# Patient Record
Sex: FEMALE
[De-identification: names, ages, dates, MRNs, and addresses within clinical notes are randomized per-mention and may not be internally consistent; named-entity substitution may affect disease eponyms.]

---

## 2017-01-10 ENCOUNTER — APPOINTMENT (OUTPATIENT)
Dept: ORTHOPEDIC SURGERY | Facility: CLINIC | Age: 72
End: 2017-01-10

## 2017-01-10 VITALS — SYSTOLIC BLOOD PRESSURE: 134 MMHG | DIASTOLIC BLOOD PRESSURE: 72 MMHG | HEART RATE: 57 BPM

## 2017-01-10 DIAGNOSIS — M79.9 SOFT TISSUE DISORDER, UNSPECIFIED: ICD-10-CM

## 2022-07-06 ENCOUNTER — APPOINTMENT (OUTPATIENT)
Dept: RHEUMATOLOGY | Facility: CLINIC | Age: 77
End: 2022-07-06

## 2022-07-06 VITALS
BODY MASS INDEX: 25.87 KG/M2 | DIASTOLIC BLOOD PRESSURE: 69 MMHG | WEIGHT: 146 LBS | HEART RATE: 68 BPM | SYSTOLIC BLOOD PRESSURE: 137 MMHG | HEIGHT: 63 IN | TEMPERATURE: 97.9 F | OXYGEN SATURATION: 98 %

## 2022-07-06 DIAGNOSIS — I25.10 ATHEROSCLEROTIC HEART DISEASE OF NATIVE CORONARY ARTERY W/OUT ANGINA PECTORIS: ICD-10-CM

## 2022-07-06 DIAGNOSIS — I26.99 OTHER PULMONARY EMBOLISM W/OUT ACUTE COR PULMONALE: ICD-10-CM

## 2022-07-06 DIAGNOSIS — M17.10 UNILATERAL PRIMARY OSTEOARTHRITIS, UNSPECIFIED KNEE: ICD-10-CM

## 2022-07-06 DIAGNOSIS — I49.9 CARDIAC ARRHYTHMIA, UNSPECIFIED: ICD-10-CM

## 2022-07-06 DIAGNOSIS — Z82.49 FAMILY HISTORY OF ISCHEMIC HEART DISEASE AND OTHER DISEASES OF THE CIRCULATORY SYSTEM: ICD-10-CM

## 2022-07-06 PROCEDURE — 99204 OFFICE O/P NEW MOD 45 MIN: CPT

## 2022-07-06 RX ORDER — DICLOFENAC SODIUM 1% 10 MG/G
1 GEL TOPICAL
Qty: 3 | Refills: 0 | Status: ACTIVE | COMMUNITY
Start: 2022-07-06 | End: 1900-01-01

## 2022-07-06 RX ORDER — PRAVASTATIN SODIUM 10 MG/1
10 TABLET ORAL
Refills: 0 | Status: ACTIVE | COMMUNITY
Start: 2022-07-06

## 2022-07-06 NOTE — HISTORY OF PRESENT ILLNESS
[FreeTextEntry1] : 76 y/o F here initial visit. \par \par Pt reports since 2016, pt had R knee pain. Pt used to work in nursing home. Pt said she had fall there once. 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 Pt was given medication, pt pain went away. Pt says however, lately, when it is about to rain, pt has knee pain. Pt also hears significant crepitus in R knee. \par Pt was told she had a tumor, and went to a [facility], and was told mass is benign. \par Pt was told that she should have conservative therapy, and not have surgery. \par Pt says tylenol, and rest helps knee pain. However, when pt carries heavy objects and walks at certain distance, pain is worse. Using stairs makes pain worse. \par \par No fevers, h/a, rashes, hair loss, oral ulcers, epistaxis, sinusitis,  swollen glands, dry mouth, dry eyes, CP, SOB, cough, vision changes, abdominal pain, GERD, n/v/d, blood in stool or urine, focal weakness, sensory loss,  Raynaud's, weight loss. \par \par PE in 2007, not sure if provoked. Pt was 5 years on coumadin, and then pt was changed to eliquis

## 2022-07-06 NOTE — ASSESSMENT
[FreeTextEntry1] : 78 y/o F  on eliquis, with severe R knee OA, lateral/medial meniscal tear\par =pain of R knee w activities\par =no swelling, stiffness\par =MRI of R knee 2016 w severe OA, meniscal tear\par \par Explained OA as condition, including treatment options. Explained there are various treatment to treat pain, but no medicine to stop progression. Recommended weight loss and muscle strengthening around joints in order to help stop or slow progression of OA. \par \par Discussed options for OA, and given pain not persistent, pt will use diclofenac gel. Counseled to use thin layer to minimize systemic absoprtion given pt on eliquis and w CAD. Advised against NSAIDs like meloxicam, given pt on eliquis and w CAD. \par \par Plan\par -rx diclofenac gel \par RTO PRN

## 2022-07-06 NOTE — DATA REVIEWED
[FreeTextEntry1] : MRI of R knee reviewed from 2016\par medial/lateral meniscus tear\par severe OA\par lobulated mass (chondrocalcinosis vs PVNS) \par

## 2022-07-06 NOTE — PHYSICAL EXAM
[General Appearance - Well Nourished] : well nourished [General Appearance - Well Developed] : well developed [Sclera] : the sclera and conjunctiva were normal [Auscultation Breath Sounds / Voice Sounds] : lungs were clear to auscultation bilaterally [Heart Sounds] : normal S1 and S2 [Heart Sounds Gallop] : no gallops [Murmurs] : no murmurs [Heart Sounds Pericardial Friction Rub] : no pericardial rub [Abdomen Soft] : soft [Abdomen Tenderness] : non-tender [] : no rash [Oriented To Time, Place, And Person] : oriented to person, place, and time